# Patient Record
Sex: MALE | Race: WHITE
[De-identification: names, ages, dates, MRNs, and addresses within clinical notes are randomized per-mention and may not be internally consistent; named-entity substitution may affect disease eponyms.]

---

## 2017-07-15 ENCOUNTER — HOSPITAL ENCOUNTER (EMERGENCY)
Dept: HOSPITAL 41 - JD.ED | Age: 27
Discharge: HOME | End: 2017-07-15
Payer: COMMERCIAL

## 2017-07-15 NOTE — EDM.PDOC
ED HPI GENERAL MEDICAL PROBLEM





- General


Chief Complaint: ENT Problem


Stated Complaint: RIGHT EAR PAIN


Time Seen by Provider: 07/15/17 14:08


Source of Information: Reports: Patient


History Limitations: Reports: No Limitations





- History of Present Illness


INITIAL COMMENTS - FREE TEXT/NARRATIVE: 





26-year-old male presents for evaluation treatment of a sore to the right ear. 

He states that he first noticed the sore yesterday. He states that it feels 

slightly painful and rates it as a 1 out of 10. He has noticed some serous 

drainage and crusting from the area. He is concerning a been bit by a bug. He 

has not been able to see or evaluate the area. He reports pain, minor swelling, 

drainage and crusting to the right ear. No associated fevers, chills, nausea, 

vomiting or headaches.


  ** Right Ear


Pain Score (Numeric/FACES): 1





- Related Data


 Allergies











Allergy/AdvReac Type Severity Reaction Status Date / Time


 


Sulfa (Sulfonamide Allergy  Cannot Verified 07/15/17 13:15





Antibiotics)   Remember  











Home Meds: 


 Home Meds





. [No Known Home Meds]  07/15/17 [History]











Past Medical History





- Past Surgical History


HEENT Surgical History: Reports: Adenoidectomy, Myringotomy w Tube(s)





Social & Family History





- Tobacco Use


Smoking Status *Q: Current Every Day Smoker


Years of Tobacco use: 8


Packs/Tins Daily: 0.5





- Recreational Drug Use


Recreational Drug Use: No





ED ROS ENT





- Review of Systems


Review Of Systems: See Below


Constitutional: Denies: Fever, Chills


HEENT: Reports: Ear Pain (right)


GI/Abdominal: Denies: Nausea, Vomiting


Skin: Reports: Wound (right ear)


Neurological: Denies: Headache





ED EXAM, ENT





- Physical Exam


Exam: See Below


Exam Limited By: No Limitations


General Appearance: Alert, WD/WN, No Apparent Distress


Eye Exam: Bilateral Eye: Normal Inspection


Ears: Normal Canal, Hearing Grossly Normal, Normal TMs, Other (aproximately 1 

cm in diameter erythematous, slightly swollen lump to the right inferior ear 

canal just with serous crusting present)


Nose: Normal Inspection


Mouth/Throat: Normal Inspection, Normal Lips, Normal Oropharynx, Normal Teeth


Head: Atraumatic, Normocephalic, Other (no mastoid tenderness)


Respiratory/Chest: No Respiratory Distress, Lungs Clear


Cardiovascular: Normal Peripheral Pulses, Regular Rate, Rhythm, No Murmur


Neurological: Alert, Oriented, Normal Cognition


Psychiatric: Normal Affect, Normal Mood


Skin: Warm, Dry, Normal Color





Course





- Vital Signs


Last Recorded V/S: 


 Last Vital Signs











Temp  36.7 C   07/15/17 13:13


 


Pulse  78   07/15/17 13:13


 


Resp  16   07/15/17 13:13


 


BP  122/77   07/15/17 13:13


 


Pulse Ox  95   07/15/17 13:13














Departure





- Departure


Time of Disposition: 14:24


Disposition: Home, Self-Care 01


Condition: Good


Clinical Impression: 


 Abrasion








- Discharge Information


Instructions:  Abrasion


Referrals: 


PCP,None [Primary Care Provider] - 


Forms:  ED Department Discharge


Additional Instructions: 


wash the area with gentle soap and water twice a day. 





antibacterial ointment such as neosporin or bacitracin to the area twice a day 

x 5 days. 





monitor for worsening swelling, redness or pus. Present to the clinic or the ER 

should these develop. 





Please return to the ER should your symptoms change or worsen.

## 2018-01-10 ENCOUNTER — HOSPITAL ENCOUNTER (EMERGENCY)
Dept: HOSPITAL 41 - JD.ED | Age: 28
Discharge: PSYCHIATRIC OR SUBSTANCE USE FACILITY | End: 2018-01-10
Payer: SELF-PAY

## 2018-01-10 ENCOUNTER — HOSPITAL ENCOUNTER (EMERGENCY)
Dept: HOSPITAL 41 - JD.ED | Age: 28
LOS: 1 days | Discharge: PSYCHIATRIC OR SUBSTANCE USE FACILITY | End: 2018-01-11
Payer: SELF-PAY

## 2018-01-10 VITALS — DIASTOLIC BLOOD PRESSURE: 97 MMHG | SYSTOLIC BLOOD PRESSURE: 145 MMHG

## 2018-01-10 VITALS — SYSTOLIC BLOOD PRESSURE: 156 MMHG | DIASTOLIC BLOOD PRESSURE: 104 MMHG

## 2018-01-10 DIAGNOSIS — Z88.2: ICD-10-CM
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
## 2018-01-10 DIAGNOSIS — R44.3: ICD-10-CM

## 2018-01-10 DIAGNOSIS — T14.91XA: Primary | ICD-10-CM

## 2018-01-10 DIAGNOSIS — F07.0: Primary | ICD-10-CM

## 2018-01-10 DIAGNOSIS — F60.9: ICD-10-CM

## 2018-01-10 DIAGNOSIS — F17.210: ICD-10-CM

## 2018-01-10 PROCEDURE — 85025 COMPLETE CBC W/AUTO DIFF WBC: CPT

## 2018-01-10 PROCEDURE — 36415 COLL VENOUS BLD VENIPUNCTURE: CPT

## 2018-01-10 PROCEDURE — 82977 ASSAY OF GGT: CPT

## 2018-01-10 PROCEDURE — 80306 DRUG TEST PRSMV INSTRMNT: CPT

## 2018-01-10 PROCEDURE — G0480 DRUG TEST DEF 1-7 CLASSES: HCPCS

## 2018-01-10 PROCEDURE — 80053 COMPREHEN METABOLIC PANEL: CPT

## 2018-01-10 PROCEDURE — 99285 EMERGENCY DEPT VISIT HI MDM: CPT

## 2018-01-10 PROCEDURE — 84443 ASSAY THYROID STIM HORMONE: CPT
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
## 2018-01-11 LAB — APAP SERPL-MCNC: 0 UG/ML (ref 10–30)
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
